# Patient Record
Sex: MALE | Race: WHITE
[De-identification: names, ages, dates, MRNs, and addresses within clinical notes are randomized per-mention and may not be internally consistent; named-entity substitution may affect disease eponyms.]

---

## 2021-09-09 ENCOUNTER — HOSPITAL ENCOUNTER (EMERGENCY)
Dept: HOSPITAL 54 - ER | Age: 41
Discharge: HOME | End: 2021-09-09
Payer: SELF-PAY

## 2021-09-09 VITALS — DIASTOLIC BLOOD PRESSURE: 76 MMHG | SYSTOLIC BLOOD PRESSURE: 129 MMHG

## 2021-09-09 VITALS — WEIGHT: 165 LBS | HEIGHT: 73 IN | BODY MASS INDEX: 21.87 KG/M2

## 2021-09-09 DIAGNOSIS — R41.0: ICD-10-CM

## 2021-09-09 DIAGNOSIS — Z59.0: ICD-10-CM

## 2021-09-09 DIAGNOSIS — F29: Primary | ICD-10-CM

## 2021-09-09 LAB
ALBUMIN SERPL BCP-MCNC: 3.9 G/DL (ref 3.4–5)
ALP SERPL-CCNC: 103 U/L (ref 46–116)
ALT SERPL W P-5'-P-CCNC: 21 U/L (ref 12–78)
APAP SERPL-MCNC: 0 UG/ML (ref 10–30)
AST SERPL W P-5'-P-CCNC: 24 U/L (ref 15–37)
BASOPHILS # BLD AUTO: 0.1 K/UL (ref 0–0.2)
BASOPHILS NFR BLD AUTO: 0.7 % (ref 0–2)
BILIRUB DIRECT SERPL-MCNC: 0 MG/DL (ref 0–0.2)
BILIRUB SERPL-MCNC: 0.2 MG/DL (ref 0.2–1)
BUN SERPL-MCNC: 18 MG/DL (ref 7–18)
CALCIUM SERPL-MCNC: 9.2 MG/DL (ref 8.5–10.1)
CHLORIDE SERPL-SCNC: 104 MMOL/L (ref 98–107)
CO2 SERPL-SCNC: 29 MMOL/L (ref 21–32)
COLOR UR: YELLOW
CREAT SERPL-MCNC: 1.1 MG/DL (ref 0.6–1.3)
EOSINOPHIL NFR BLD AUTO: 2.6 % (ref 0–6)
ETHANOL SERPL-MCNC: < 3 MG/DL (ref 0–0)
GLUCOSE SERPL-MCNC: 97 MG/DL (ref 74–106)
HCT VFR BLD AUTO: 41 % (ref 39–51)
HGB BLD-MCNC: 13.9 G/DL (ref 13.5–17.5)
LYMPHOCYTES NFR BLD AUTO: 1.4 K/UL (ref 0.8–4.8)
LYMPHOCYTES NFR BLD AUTO: 20.6 % (ref 20–44)
MCHC RBC AUTO-ENTMCNC: 34 G/DL (ref 31–36)
MCV RBC AUTO: 92 FL (ref 80–96)
MONOCYTES NFR BLD AUTO: 0.5 K/UL (ref 0.1–1.3)
MONOCYTES NFR BLD AUTO: 6.5 % (ref 2–12)
NEUTROPHILS # BLD AUTO: 4.8 K/UL (ref 1.8–8.9)
NEUTROPHILS NFR BLD AUTO: 69.6 % (ref 43–81)
PH UR STRIP: 7.5 [PH] (ref 5–8)
PLATELET # BLD AUTO: 231 K/UL (ref 150–450)
POTASSIUM SERPL-SCNC: 4.6 MMOL/L (ref 3.5–5.1)
PROT SERPL-MCNC: 7.2 G/DL (ref 6.4–8.2)
RBC # BLD AUTO: 4.41 MIL/UL (ref 4.5–6)
SODIUM SERPL-SCNC: 140 MMOL/L (ref 136–145)
UROBILINOGEN UR STRIP-MCNC: 0.2 EU/DL
WBC NRBC COR # BLD AUTO: 7 K/UL (ref 4.3–11)

## 2021-09-09 PROCEDURE — G0480 DRUG TEST DEF 1-7 CLASSES: HCPCS

## 2021-09-09 SDOH — ECONOMIC STABILITY - HOUSING INSECURITY: HOMELESSNESS: Z59.0

## 2021-09-09 NOTE — NUR
THE PATIENT RESTING COMFORTABLY IN ER BED 13. DENIES HAVING ANY DISTRESS. 
BREATHING EVEN AND UNLABORED. WILL CONTINUE TO MONITOR THE PATIENT.

## 2021-09-09 NOTE — NUR
SS Note: 



SW attempted to meet with pt. and SW was notified by nursing that pt. refused to wait for SS 
consult for homeless D/C and left stating he will look for shelter placement himself.

## 2021-09-09 NOTE — NUR
THE PATIENT [id number], FROM [place] C/O ANXIETY. THE PATIENT IS ALERT AND 
ORIENTED X4. DENIES SI/HI. IN ROOM AIR AND DENIES SOB. BREATHING EVEN AND 
UNLABORED. ATTACHED TO THE MONITOR.

WILL CONTINUE TO MONITOR THE PATIENT.

## 2021-09-19 ENCOUNTER — HOSPITAL ENCOUNTER (EMERGENCY)
Dept: HOSPITAL 54 - ER | Age: 41
Discharge: PSYCHIATRIC OR SUBSTANCE USE FACILITY | End: 2021-09-19
Payer: MEDICAID

## 2021-09-19 VITALS — WEIGHT: 162 LBS | HEIGHT: 67 IN | BODY MASS INDEX: 25.43 KG/M2

## 2021-09-19 VITALS — DIASTOLIC BLOOD PRESSURE: 60 MMHG | SYSTOLIC BLOOD PRESSURE: 115 MMHG

## 2021-09-19 DIAGNOSIS — Z20.822: ICD-10-CM

## 2021-09-19 DIAGNOSIS — Z59.0: ICD-10-CM

## 2021-09-19 DIAGNOSIS — R44.1: Primary | ICD-10-CM

## 2021-09-19 DIAGNOSIS — R44.0: ICD-10-CM

## 2021-09-19 DIAGNOSIS — Z82.49: ICD-10-CM

## 2021-09-19 DIAGNOSIS — F41.9: ICD-10-CM

## 2021-09-19 LAB
ALBUMIN SERPL BCP-MCNC: 3.7 G/DL (ref 3.4–5)
ALP SERPL-CCNC: 78 U/L (ref 46–116)
ALT SERPL W P-5'-P-CCNC: 22 U/L (ref 12–78)
APAP SERPL-MCNC: < 10 UG/ML (ref 10–30)
AST SERPL W P-5'-P-CCNC: 19 U/L (ref 15–37)
BASOPHILS # BLD AUTO: 0.1 K/UL (ref 0–0.2)
BASOPHILS NFR BLD AUTO: 0.6 % (ref 0–2)
BILIRUB DIRECT SERPL-MCNC: 0.1 MG/DL (ref 0–0.2)
BILIRUB SERPL-MCNC: 0.3 MG/DL (ref 0.2–1)
BUN SERPL-MCNC: 16 MG/DL (ref 7–18)
CALCIUM SERPL-MCNC: 9 MG/DL (ref 8.5–10.1)
CHLORIDE SERPL-SCNC: 107 MMOL/L (ref 98–107)
CO2 SERPL-SCNC: 26 MMOL/L (ref 21–32)
COLOR UR: YELLOW
CREAT SERPL-MCNC: 1.1 MG/DL (ref 0.6–1.3)
EOSINOPHIL NFR BLD AUTO: 2.2 % (ref 0–6)
ETHANOL SERPL-MCNC: < 3 MG/DL (ref 0–0)
GLUCOSE SERPL-MCNC: 101 MG/DL (ref 74–106)
HCT VFR BLD AUTO: 38 % (ref 39–51)
HGB BLD-MCNC: 12.9 G/DL (ref 13.5–17.5)
LYMPHOCYTES NFR BLD AUTO: 2 K/UL (ref 0.8–4.8)
LYMPHOCYTES NFR BLD AUTO: 23.9 % (ref 20–44)
MCHC RBC AUTO-ENTMCNC: 34 G/DL (ref 31–36)
MCV RBC AUTO: 92 FL (ref 80–96)
MONOCYTES NFR BLD AUTO: 0.6 K/UL (ref 0.1–1.3)
MONOCYTES NFR BLD AUTO: 6.8 % (ref 2–12)
NEUTROPHILS # BLD AUTO: 5.5 K/UL (ref 1.8–8.9)
NEUTROPHILS NFR BLD AUTO: 66.5 % (ref 43–81)
PH UR STRIP: 7.5 [PH] (ref 5–8)
PLATELET # BLD AUTO: 242 K/UL (ref 150–450)
POTASSIUM SERPL-SCNC: 3.8 MMOL/L (ref 3.5–5.1)
PROT SERPL-MCNC: 6.8 G/DL (ref 6.4–8.2)
RBC # BLD AUTO: 4.16 MIL/UL (ref 4.5–6)
SODIUM SERPL-SCNC: 144 MMOL/L (ref 136–145)
UROBILINOGEN UR STRIP-MCNC: 0.2 EU/DL
WBC NRBC COR # BLD AUTO: 8.2 K/UL (ref 4.3–11)

## 2021-09-19 PROCEDURE — 80143 DRUG ASSAY ACETAMINOPHEN: CPT

## 2021-09-19 PROCEDURE — 36415 COLL VENOUS BLD VENIPUNCTURE: CPT

## 2021-09-19 PROCEDURE — 85025 COMPLETE CBC W/AUTO DIFF WBC: CPT

## 2021-09-19 PROCEDURE — 80320 DRUG SCREEN QUANTALCOHOLS: CPT

## 2021-09-19 PROCEDURE — 87426 SARSCOV CORONAVIRUS AG IA: CPT

## 2021-09-19 PROCEDURE — G0480 DRUG TEST DEF 1-7 CLASSES: HCPCS

## 2021-09-19 PROCEDURE — 80076 HEPATIC FUNCTION PANEL: CPT

## 2021-09-19 PROCEDURE — 99285 EMERGENCY DEPT VISIT HI MDM: CPT

## 2021-09-19 PROCEDURE — C9803 HOPD COVID-19 SPEC COLLECT: HCPCS

## 2021-09-19 PROCEDURE — 81003 URINALYSIS AUTO W/O SCOPE: CPT

## 2021-09-19 PROCEDURE — 80048 BASIC METABOLIC PNL TOTAL CA: CPT

## 2021-09-19 PROCEDURE — 80307 DRUG TEST PRSMV CHEM ANLYZR: CPT

## 2021-09-19 SDOH — ECONOMIC STABILITY - HOUSING INSECURITY: HOMELESSNESS: Z59.0

## 2021-09-19 NOTE — NUR
MYNOR CALLED PT ACCEPTED TO [facility] UNIT [address] MCDANIEL PLEASE CALL 
[phone] FOR REPORT.

## 2021-09-19 NOTE — NUR
PT [address] C/O HEARING VOICES/HALLUCINATION. REQUESTING 
VOLUNTARY PSYCH ADMISSION TO [facility]. PT IS AAOX4, NOT IN RESPIRATORY 
DISTRESS, V/S STABLE, KEPT RESTED AND COMFORTABLE.

## 2021-09-25 ENCOUNTER — HOSPITAL ENCOUNTER (EMERGENCY)
Dept: HOSPITAL 54 - ER | Age: 41
LOS: 1 days | Discharge: PSYCHIATRIC OR SUBSTANCE USE FACILITY | End: 2021-09-26
Payer: MEDICAID

## 2021-09-25 VITALS — WEIGHT: 170 LBS | BODY MASS INDEX: 22.53 KG/M2 | HEIGHT: 73 IN

## 2021-09-25 DIAGNOSIS — Z20.822: ICD-10-CM

## 2021-09-25 DIAGNOSIS — R45.851: Primary | ICD-10-CM

## 2021-09-25 DIAGNOSIS — F29: ICD-10-CM

## 2021-09-25 LAB
ALBUMIN SERPL BCP-MCNC: 4.1 G/DL (ref 3.4–5)
ALP SERPL-CCNC: 104 U/L (ref 46–116)
ALT SERPL W P-5'-P-CCNC: 27 U/L (ref 12–78)
APAP SERPL-MCNC: < 10 UG/ML (ref 10–30)
AST SERPL W P-5'-P-CCNC: 21 U/L (ref 15–37)
BASOPHILS # BLD AUTO: 0 K/UL (ref 0–0.2)
BASOPHILS NFR BLD AUTO: 0.4 % (ref 0–2)
BILIRUB DIRECT SERPL-MCNC: 0.1 MG/DL (ref 0–0.2)
BILIRUB SERPL-MCNC: 0.3 MG/DL (ref 0.2–1)
BILIRUB UR QL STRIP: NEGATIVE
BUN SERPL-MCNC: 21 MG/DL (ref 7–18)
CALCIUM SERPL-MCNC: 8.9 MG/DL (ref 8.5–10.1)
CHLORIDE SERPL-SCNC: 103 MMOL/L (ref 98–107)
CO2 SERPL-SCNC: 29 MMOL/L (ref 21–32)
COLOR UR: YELLOW
CREAT SERPL-MCNC: 1.1 MG/DL (ref 0.6–1.3)
EOSINOPHIL NFR BLD AUTO: 3.3 % (ref 0–6)
ETHANOL SERPL-MCNC: < 3 MG/DL (ref 0–0)
GLUCOSE SERPL-MCNC: 119 MG/DL (ref 74–106)
GLUCOSE UR STRIP-MCNC: NEGATIVE MG/DL
HCT VFR BLD AUTO: 42 % (ref 39–51)
HGB BLD-MCNC: 14.5 G/DL (ref 13.5–17.5)
LEUKOCYTE ESTERASE UR QL STRIP: NEGATIVE
LYMPHOCYTES NFR BLD AUTO: 1.4 K/UL (ref 0.8–4.8)
LYMPHOCYTES NFR BLD AUTO: 25.7 % (ref 20–44)
MCHC RBC AUTO-ENTMCNC: 34 G/DL (ref 31–36)
MCV RBC AUTO: 92 FL (ref 80–96)
MONOCYTES NFR BLD AUTO: 0.6 K/UL (ref 0.1–1.3)
MONOCYTES NFR BLD AUTO: 10.6 % (ref 2–12)
NEUTROPHILS # BLD AUTO: 3.2 K/UL (ref 1.8–8.9)
NEUTROPHILS NFR BLD AUTO: 60 % (ref 43–81)
NITRITE UR QL STRIP: NEGATIVE
PH UR STRIP: 7 [PH] (ref 5–8)
PLATELET # BLD AUTO: 227 K/UL (ref 150–450)
POTASSIUM SERPL-SCNC: 4.1 MMOL/L (ref 3.5–5.1)
PROT SERPL-MCNC: 7.8 G/DL (ref 6.4–8.2)
PROT UR QL STRIP: NEGATIVE MG/DL
RBC # BLD AUTO: 4.61 MIL/UL (ref 4.5–6)
SODIUM SERPL-SCNC: 141 MMOL/L (ref 136–145)
UROBILINOGEN UR STRIP-MCNC: 0.2 EU/DL
WBC NRBC COR # BLD AUTO: 5.3 K/UL (ref 4.3–11)

## 2021-09-25 PROCEDURE — 80320 DRUG SCREEN QUANTALCOHOLS: CPT

## 2021-09-25 PROCEDURE — 81003 URINALYSIS AUTO W/O SCOPE: CPT

## 2021-09-25 PROCEDURE — 87426 SARSCOV CORONAVIRUS AG IA: CPT

## 2021-09-25 PROCEDURE — 80307 DRUG TEST PRSMV CHEM ANLYZR: CPT

## 2021-09-25 PROCEDURE — 80048 BASIC METABOLIC PNL TOTAL CA: CPT

## 2021-09-25 PROCEDURE — 80143 DRUG ASSAY ACETAMINOPHEN: CPT

## 2021-09-25 PROCEDURE — C9803 HOPD COVID-19 SPEC COLLECT: HCPCS

## 2021-09-25 PROCEDURE — 80076 HEPATIC FUNCTION PANEL: CPT

## 2021-09-25 PROCEDURE — G0480 DRUG TEST DEF 1-7 CLASSES: HCPCS

## 2021-09-25 PROCEDURE — 99285 EMERGENCY DEPT VISIT HI MDM: CPT

## 2021-09-25 PROCEDURE — 85025 COMPLETE CBC W/AUTO DIFF WBC: CPT

## 2021-09-25 PROCEDURE — 36415 COLL VENOUS BLD VENIPUNCTURE: CPT

## 2021-09-26 VITALS — SYSTOLIC BLOOD PRESSURE: 111 MMHG | DIASTOLIC BLOOD PRESSURE: 64 MMHG

## 2021-10-06 ENCOUNTER — HOSPITAL ENCOUNTER (EMERGENCY)
Dept: HOSPITAL 54 - ER | Age: 41
Discharge: PSYCHIATRIC OR SUBSTANCE USE FACILITY | End: 2021-10-06
Payer: MEDICAID

## 2021-10-06 ENCOUNTER — HOSPITAL ENCOUNTER (EMERGENCY)
Dept: HOSPITAL 54 - ER | Age: 41
Discharge: HOME | End: 2021-10-06
Payer: MEDICAID

## 2021-10-06 VITALS — BODY MASS INDEX: 41.75 KG/M2 | HEIGHT: 73 IN | WEIGHT: 315 LBS

## 2021-10-06 VITALS — BODY MASS INDEX: 24.39 KG/M2 | WEIGHT: 184 LBS | HEIGHT: 73 IN

## 2021-10-06 VITALS — DIASTOLIC BLOOD PRESSURE: 72 MMHG | SYSTOLIC BLOOD PRESSURE: 127 MMHG

## 2021-10-06 VITALS — DIASTOLIC BLOOD PRESSURE: 70 MMHG | SYSTOLIC BLOOD PRESSURE: 108 MMHG

## 2021-10-06 DIAGNOSIS — F32.A: ICD-10-CM

## 2021-10-06 DIAGNOSIS — Z20.822: ICD-10-CM

## 2021-10-06 DIAGNOSIS — G47.00: Primary | ICD-10-CM

## 2021-10-06 DIAGNOSIS — Z79.899: ICD-10-CM

## 2021-10-06 DIAGNOSIS — F41.9: ICD-10-CM

## 2021-10-06 DIAGNOSIS — F17.200: ICD-10-CM

## 2021-10-06 DIAGNOSIS — F25.9: ICD-10-CM

## 2021-10-06 DIAGNOSIS — R45.851: Primary | ICD-10-CM

## 2021-10-06 DIAGNOSIS — Z59.00: ICD-10-CM

## 2021-10-06 LAB
ALBUMIN SERPL BCP-MCNC: 3.9 G/DL (ref 3.4–5)
ALP SERPL-CCNC: 96 U/L (ref 46–116)
ALT SERPL W P-5'-P-CCNC: 30 U/L (ref 12–78)
APAP SERPL-MCNC: < 0 UG/ML (ref 10–30)
AST SERPL W P-5'-P-CCNC: 20 U/L (ref 15–37)
BASOPHILS # BLD AUTO: 0 K/UL (ref 0–0.2)
BASOPHILS NFR BLD AUTO: 0.5 % (ref 0–2)
BILIRUB DIRECT SERPL-MCNC: 0.1 MG/DL (ref 0–0.2)
BILIRUB SERPL-MCNC: 0.3 MG/DL (ref 0.2–1)
BUN SERPL-MCNC: 17 MG/DL (ref 7–18)
CALCIUM SERPL-MCNC: 8.9 MG/DL (ref 8.5–10.1)
CHLORIDE SERPL-SCNC: 106 MMOL/L (ref 98–107)
CO2 SERPL-SCNC: 27 MMOL/L (ref 21–32)
COLOR UR: YELLOW
CREAT SERPL-MCNC: 1.2 MG/DL (ref 0.6–1.3)
EOSINOPHIL NFR BLD AUTO: 4.1 % (ref 0–6)
ETHANOL SERPL-MCNC: < 3 MG/DL (ref 0–0)
GLUCOSE SERPL-MCNC: 93 MG/DL (ref 74–106)
HCT VFR BLD AUTO: 43 % (ref 39–51)
HGB BLD-MCNC: 14.6 G/DL (ref 13.5–17.5)
LYMPHOCYTES NFR BLD AUTO: 2 K/UL (ref 0.8–4.8)
LYMPHOCYTES NFR BLD AUTO: 27.3 % (ref 20–44)
MCHC RBC AUTO-ENTMCNC: 34 G/DL (ref 31–36)
MCV RBC AUTO: 92 FL (ref 80–96)
MONOCYTES NFR BLD AUTO: 0.8 K/UL (ref 0.1–1.3)
MONOCYTES NFR BLD AUTO: 11.7 % (ref 2–12)
NEUTROPHILS # BLD AUTO: 4.1 K/UL (ref 1.8–8.9)
NEUTROPHILS NFR BLD AUTO: 56.4 % (ref 43–81)
PH UR STRIP: 7 [PH] (ref 5–8)
PLATELET # BLD AUTO: 227 K/UL (ref 150–450)
POTASSIUM SERPL-SCNC: 4 MMOL/L (ref 3.5–5.1)
PROT SERPL-MCNC: 7.3 G/DL (ref 6.4–8.2)
RBC # BLD AUTO: 4.7 MIL/UL (ref 4.5–6)
RBC #/AREA URNS HPF: (no result) /HPF (ref 0–2)
SODIUM SERPL-SCNC: 141 MMOL/L (ref 136–145)
UROBILINOGEN UR STRIP-MCNC: 0.2 EU/DL
WBC #/AREA URNS HPF: (no result) /HPF (ref 0–3)
WBC NRBC COR # BLD AUTO: 7.3 K/UL (ref 4.3–11)

## 2021-10-06 PROCEDURE — 80307 DRUG TEST PRSMV CHEM ANLYZR: CPT

## 2021-10-06 PROCEDURE — G0480 DRUG TEST DEF 1-7 CLASSES: HCPCS

## 2021-10-06 PROCEDURE — 36415 COLL VENOUS BLD VENIPUNCTURE: CPT

## 2021-10-06 PROCEDURE — 80320 DRUG SCREEN QUANTALCOHOLS: CPT

## 2021-10-06 PROCEDURE — 80143 DRUG ASSAY ACETAMINOPHEN: CPT

## 2021-10-06 PROCEDURE — C9803 HOPD COVID-19 SPEC COLLECT: HCPCS

## 2021-10-06 PROCEDURE — 80048 BASIC METABOLIC PNL TOTAL CA: CPT

## 2021-10-06 PROCEDURE — 85025 COMPLETE CBC W/AUTO DIFF WBC: CPT

## 2021-10-06 PROCEDURE — 99285 EMERGENCY DEPT VISIT HI MDM: CPT

## 2021-10-06 PROCEDURE — 80076 HEPATIC FUNCTION PANEL: CPT

## 2021-10-06 PROCEDURE — 87426 SARSCOV CORONAVIRUS AG IA: CPT

## 2021-10-06 PROCEDURE — 81001 URINALYSIS AUTO W/SCOPE: CPT

## 2021-10-06 SDOH — ECONOMIC STABILITY - HOUSING INSECURITY: HOMELESSNESS UNSPECIFIED: Z59.00

## 2021-10-06 NOTE — NUR
PER ART AT SOCAL INTAKE; PT GOT ACCEPTED AT [facility] BY DR GARCIA, UNIT 
2, # FOR REPORT: [phone]

## 2021-10-06 NOTE — NUR
Patient came in to the er c/o suicidal thoughts " i want to OD on OTC pills". 
On room air, breathing evenly and unlabored. Sitter at bedside for constant 
monitoring. Will continue to monitor accordingly.

## 2021-10-15 ENCOUNTER — HOSPITAL ENCOUNTER (EMERGENCY)
Dept: HOSPITAL 54 - ER | Age: 41
Discharge: HOME | End: 2021-10-15
Payer: MEDICAID

## 2021-10-15 VITALS — SYSTOLIC BLOOD PRESSURE: 132 MMHG | DIASTOLIC BLOOD PRESSURE: 62 MMHG

## 2021-10-15 VITALS — BODY MASS INDEX: 23.86 KG/M2 | HEIGHT: 73 IN | WEIGHT: 180 LBS

## 2021-10-15 DIAGNOSIS — G47.00: ICD-10-CM

## 2021-10-15 DIAGNOSIS — Z60.2: ICD-10-CM

## 2021-10-15 DIAGNOSIS — Z79.899: ICD-10-CM

## 2021-10-15 DIAGNOSIS — F41.9: Primary | ICD-10-CM

## 2021-10-15 DIAGNOSIS — F17.200: ICD-10-CM

## 2021-10-15 DIAGNOSIS — Z76.0: ICD-10-CM

## 2021-10-15 SDOH — SOCIAL STABILITY - SOCIAL INSECURITY: PROBLEMS RELATED TO LIVING ALONE: Z60.2

## 2021-10-27 ENCOUNTER — HOSPITAL ENCOUNTER (EMERGENCY)
Dept: HOSPITAL 54 - ER | Age: 41
Discharge: HOME | End: 2021-10-27
Payer: SELF-PAY

## 2021-10-27 VITALS — SYSTOLIC BLOOD PRESSURE: 117 MMHG | DIASTOLIC BLOOD PRESSURE: 65 MMHG

## 2021-10-27 VITALS — BODY MASS INDEX: 23.86 KG/M2 | WEIGHT: 180 LBS | HEIGHT: 73 IN

## 2021-10-27 DIAGNOSIS — F32.A: ICD-10-CM

## 2021-10-27 DIAGNOSIS — Z59.01: ICD-10-CM

## 2021-10-27 DIAGNOSIS — G47.00: Primary | ICD-10-CM

## 2021-10-27 DIAGNOSIS — Z20.822: ICD-10-CM

## 2021-10-27 DIAGNOSIS — F41.9: ICD-10-CM

## 2021-10-27 DIAGNOSIS — F17.200: ICD-10-CM

## 2021-10-27 DIAGNOSIS — Z53.8: ICD-10-CM

## 2021-10-27 LAB
ALBUMIN SERPL BCP-MCNC: 3.9 G/DL (ref 3.4–5)
ALP SERPL-CCNC: 107 U/L (ref 46–116)
ALT SERPL W P-5'-P-CCNC: 53 U/L (ref 12–78)
APAP SERPL-MCNC: < 10 UG/ML (ref 10–30)
AST SERPL W P-5'-P-CCNC: 35 U/L (ref 15–37)
BASOPHILS # BLD AUTO: 0 K/UL (ref 0–0.2)
BASOPHILS NFR BLD AUTO: 0.3 % (ref 0–2)
BILIRUB DIRECT SERPL-MCNC: 0.1 MG/DL (ref 0–0.2)
BILIRUB SERPL-MCNC: 0.3 MG/DL (ref 0.2–1)
BILIRUB UR QL STRIP: NEGATIVE
BUN SERPL-MCNC: 11 MG/DL (ref 7–18)
CALCIUM SERPL-MCNC: 8.7 MG/DL (ref 8.5–10.1)
CHLORIDE SERPL-SCNC: 106 MMOL/L (ref 98–107)
CO2 SERPL-SCNC: 30 MMOL/L (ref 21–32)
COLOR UR: YELLOW
CREAT SERPL-MCNC: 1.2 MG/DL (ref 0.6–1.3)
EOSINOPHIL NFR BLD AUTO: 3.8 % (ref 0–6)
ETHANOL SERPL-MCNC: < 3 MG/DL (ref 0–0)
GLUCOSE SERPL-MCNC: 83 MG/DL (ref 74–106)
GLUCOSE UR STRIP-MCNC: NEGATIVE MG/DL
HCT VFR BLD AUTO: 43 % (ref 39–51)
HGB BLD-MCNC: 14.5 G/DL (ref 13.5–17.5)
LEUKOCYTE ESTERASE UR QL STRIP: NEGATIVE
LYMPHOCYTES NFR BLD AUTO: 1.8 K/UL (ref 0.8–4.8)
LYMPHOCYTES NFR BLD AUTO: 26 % (ref 20–44)
MCHC RBC AUTO-ENTMCNC: 34 G/DL (ref 31–36)
MCV RBC AUTO: 92 FL (ref 80–96)
MONOCYTES NFR BLD AUTO: 0.6 K/UL (ref 0.1–1.3)
MONOCYTES NFR BLD AUTO: 8.4 % (ref 2–12)
NEUTROPHILS # BLD AUTO: 4.2 K/UL (ref 1.8–8.9)
NEUTROPHILS NFR BLD AUTO: 61.5 % (ref 43–81)
NITRITE UR QL STRIP: NEGATIVE
PH UR STRIP: 7 [PH] (ref 5–8)
PLATELET # BLD AUTO: 215 K/UL (ref 150–450)
POTASSIUM SERPL-SCNC: 3.6 MMOL/L (ref 3.5–5.1)
PROT SERPL-MCNC: 7.4 G/DL (ref 6.4–8.2)
PROT UR QL STRIP: NEGATIVE MG/DL
RBC # BLD AUTO: 4.63 MIL/UL (ref 4.5–6)
SODIUM SERPL-SCNC: 142 MMOL/L (ref 136–145)
UROBILINOGEN UR STRIP-MCNC: 0.2 EU/DL
WBC NRBC COR # BLD AUTO: 6.8 K/UL (ref 4.3–11)

## 2021-10-27 PROCEDURE — C9803 HOPD COVID-19 SPEC COLLECT: HCPCS

## 2021-10-27 PROCEDURE — 99283 EMERGENCY DEPT VISIT LOW MDM: CPT

## 2021-10-27 PROCEDURE — 80143 DRUG ASSAY ACETAMINOPHEN: CPT

## 2021-10-27 PROCEDURE — 80076 HEPATIC FUNCTION PANEL: CPT

## 2021-10-27 PROCEDURE — 81003 URINALYSIS AUTO W/O SCOPE: CPT

## 2021-10-27 PROCEDURE — 85025 COMPLETE CBC W/AUTO DIFF WBC: CPT

## 2021-10-27 PROCEDURE — 80307 DRUG TEST PRSMV CHEM ANLYZR: CPT

## 2021-10-27 PROCEDURE — 36415 COLL VENOUS BLD VENIPUNCTURE: CPT

## 2021-10-27 PROCEDURE — 80048 BASIC METABOLIC PNL TOTAL CA: CPT

## 2021-10-27 PROCEDURE — 80320 DRUG SCREEN QUANTALCOHOLS: CPT

## 2021-10-27 PROCEDURE — 87426 SARSCOV CORONAVIRUS AG IA: CPT

## 2021-10-27 PROCEDURE — G0480 DRUG TEST DEF 1-7 CLASSES: HCPCS

## 2021-10-27 SDOH — ECONOMIC STABILITY - HOUSING INSECURITY: SHELTERED HOMELESSNESS: Z59.01

## 2021-10-27 NOTE — NUR
THE PATIENT BIBS FOR FEELING ANXIOUS, HAVING AUDITORY HALLUCINATIONS BUT CAN`T 
RECALL WHAT ARE THE VOICES SAYING. DENIES SI/HI. THE PATIENT WANTS TO GO TO [facility]. ALERT AND ORIENTED X4. DENIES PAIN. IN ROOM AIR AND DENIES SOB. 
RESPIRATION REGULAR AND UNLABORED. WILL CONTINUE TO MONITOR THE PATIENT.

## 2021-10-27 NOTE — NUR
SS consult: 

SS consult requested for homelessness & mental health concerns. Pt. is a 
41-year-old  male who presented to the ER wanting medical clearance to 
go to [facility][[address] [place] [address] [phone] FAX:[phone]]  for medication 
adjustement per pt. Per the EMR, pt. is homeless and is feeling anxious. Upon 
SS consult, pt. is A&O x4 and appears unkempt. Pt. presents with an anxious 
mood and speech is WNL. Pt. was pleasant & cooperative throughout SS 
assessment. SW explored pt. 's living situation and pt. he has been homeless 
for three months. SW offered homeless resources and pt. accepted. Pt. stated he 
will be moving into transitional housing on November 1st. Pt. stated he 
receives SSDI & EBT. SW explored pt.'s psychiatric history. Pt. stated he has 
been diagnosed with Generalized Anxiety Disorder and Insomnia. However, pt. 
also stated he is currently not on medicationa dn that is why he is seeking 
medical attention. Pt. stated he is having auditory hallucinations due to his 
chronic insomnia. Pt. stated he wants medication so he can sleep at night. Pt. 
denies current SI and stated he had SI a month ago and denies current SI. Pt. 
denies HI and denies visual hallucinations. SW explored pt.'s support system 
and pt. states he has some family who he keeps in contact with.



Pt. signed homeless waive and it was placed in the pt.'s chart. [facility] provided pt. 
with homeless resources and pt. accepted them.



Plan: Pt. was referred to [facility] [[address] 
[place] [address] [phone] FAX:[phone]] 
for inpatient psychiatric treatment. [facility]LEMA was notified that pt. was not accepted. 
[facility]ULEMA provided pt. with outpatient mental health resources and pt. accepted them. 



Mental Health resources provided: [facility] [address] [place] [address] [phone]; [facility]. [address] 
[facility] [address], [place] [address] [phone]; [facility] [address] [place] [address] [phone]; [facility] [address] [place], [place] 
[address] [phone]

Healthcare Clinics: [facility] [address], [address] [place]. [place] [phone]; [facility] [address] [place] [address]; [facility] [address] [facility] [place] [address] [phone]



Counseling--Outpatient

[facility]

[address], [address] [place]

[place] [address] [phone]

(Specializes in in-depth psychotherapy for emotional distress: anxiety, 
depression, interpersonal conflicts, life transitions, childhood abuse)



[facility]

[address] [place] [address] [phone]

(Assist with solving problem marital difficulties, separation & divorce, aging 
parents, death & grief, chronic & terminal illness)



[facility]

[address] [place] [address] [phone]

(Deal with loss & grief, anxiety, marital difficulties) 



Homebound/Mental Health Services

[address] [facility], [address]

[place] [address] [phone]

(Provide in-home mental services to people who are incapable of leaving their 
homes)



[facility]eds of the Elderly

Senior Service/Resource Center

[address] [place]

[place] [address] [phone]



[facility] 

[address]

[place] [address] [phone]

PSYCHIATRIC OUTPATIENT SERVICES



[facility] Partial Hospitalization and Intensive Outpatient Program 
(Managed Care and [place] Only)[address].

[facility] [phone]

[facility]

Partial Hospitalization and Outpatient [address]  [address]

[place], [place] [phone]

KANE MORROW

[facility] [address] [facility]. 
[address]

[place], [place] [phone]

[facility] Partial Hospitalization and Outpatient 
[address], [phone]



Substance Abuse resources provided included: [facility] Substance Abuse 
Self-Helpline ([facility]) [phone]; [facility] [address] [facility]. [place] [address] [phone]; [facility] [address] [facility] [address] [phone]; [facility] [address] 
[facility] [place] [address] [phone]; [facility] 
[id number] [phone];  [facility] [address] [facility] [address] [phone]; Lynne 
[facility] [address] [facility] [id number] [phone]; [facility] 
Substance Abuse Helpline([facility] [phone]; [facility]  [phone]; [facility] [phone] [place]; [facility]  [phone] [place]; [facility]  [phone] [place]; [facility] [phone] [place]; 
[facility]  [phone] [place]; [facility] [place] [phone] 
[place]; [facility] [phone] [facility]; [facility] [place] [phone] [place]; Alcoholics Anonymous (909) 981-6751-SFV; 
[facility] [phone] [phone]; Marijuana Anonymous (445) 881-2123-SFV; 
Narcotics Anonymous www.na.org;

Year-round shelters: [facility] [address] [place] [address] 
[phone]; [facility] [address] [place] [address]; 
[facility] [address] [facility] [address] [phone]

Winter Shelters: 

Katrina Moser Goodwin

Provider: [facility] [place]

Address: [address] [place], [address]

# of Beds: 47

Phone Number: [phone]

Population Served: [facility]

 

\[facility]\"" 6 | [facility]

 

Lyssa Huerta Goodwin

Provider: [facility]

Address: [address] [facility], [address] 

# of Beds: 66

Phone Number: [phone]

Population Served: [facility]

 

[place]

Provider: [facility]

Address: [address] [facility], [address]

# of Beds: 56

Phone Number: [phone]

Population Served: [facility]

 

[place] 

Provider: [facility]

Address: [address], [address]

# of Beds: 49

Phone Number: [phone]

Population Served: [facility]

 

SPA 8 | [facility] [place])

Provider: [facility]

Address: [address] [facility], [address]

# of Beds: 37

Phone Number: [phone]

Population Served: [facility]



Hygiene: [facility] [address] [phone]; [facility] [address] [facility] [phone]; [facility] [address], [place] [phone].

Food Resources: [place] [facility] at [facility] [address] [place]; [facility] ([facility]) [address]; [facility] [address] [place]; 
[facility] [address] [place].

## 2021-11-05 NOTE — NUR
PT IS NOT IN ROOM. CHECKED IN THE RESTROOM AND OUTSIDE OF THE R BUT NO WHERE TO 
BE FOUND. PT WAS LAST SEEN WHEN URINE WAS COLLECTED. MD MADE AWARE

## 2021-11-06 NOTE — NUR
PT BIB SELF RQUESTING MED CLEARANCE FOR VOLUNTARY PSYCH ADMISSION TO [facility]. DENIES SI/HI. PT IS AAOX4, NOT IN RESPIRATORY DISTRESS, V/S STABLE, KEPT 
RESTED AND COMFORTABLE. WILL CONTINUE TO MONITOR.

## 2021-11-06 NOTE — NUR
TRANSFER INFO: PT ACCEPTED AT [facility] BY DR GARCIA, RN FOR REPORT 
[phone], ETA 45 MIN TO 1 HOUR

## 2021-12-06 NOTE — NUR
Patient discharged to home in stable condition. Written and verbal after care 
instructions given. Patient verbalizes understanding of instruction. RX GIVEN

## 2022-02-20 NOTE — NUR
TO ER BED 14, C/O ABD PAIN WITH DIARRHEA & VOMITING SINCE THIS AM. HX OF 
ULCERATIVE COLITIS, P/S 7/10, AAOX3, BREATHING EVEN AND NON LABORED, AWAITING 
MD OROSCO

## 2022-02-20 NOTE — NUR
Patient discharged to home in stable condition. Written and verbal after care 
instructions given. Patient verbalizes understanding of instruction. PT  
ambulatory with a steady gait

## 2022-03-05 NOTE — NUR
TO ER BED 12, BIB SELF C/O HEADACHE STARTED 3 HRS AGO, AAOX3, BRATHING EVEN AND 
NON LABORED, CONNECTED TO MONITOR, AWAITING MD OROSCO

## 2022-03-19 NOTE — NUR
Patient given written and verbal discharge instructions. Patient verbalizes 
understanding of instructions. Patient is ambulatory with steady gait. Refuses 
offer of shelter placement. Patient given list of available shelters in 
surrounding area. In proper clothing. Name band removed. All belongings with 
the patient.

## 2022-03-23 NOTE — NUR
BIBS C/O HAVING S/I WITH PLAN TO OD ON PILLS. SEEKING VOLUNTARY ADMISSION TO 
[facility]. ALSO C/O MIGRAINES. PATIENT ALERT AND ORIENTED X3. AMBULATORY 
WITH NON LABORED BREATHING BELONGINGS TAKEN AND PUT IN A LOCKER.

## 2022-05-07 NOTE — NUR
TO ER BED 19. BIBS FOR S/I WITH PLAN TO OD. ALSO C/O L ANKLE PAIN. PT CHANGED 
INTO GOWN. BELONGINGS OBTAINED AND SECURED. 1:1 SITTER. V/S WITHIN LIMITS. WILL 
CONTINUE TO MONITOR.

## 2022-05-08 NOTE — NUR
Pt accepted at [facility] under the care of Dr. Elizabeth. Room will be given 
upon arrival. Report to

## 2022-05-08 NOTE — NUR
[facility] AT BEDSIDE FOR PT TRANSPORT TO [facility] IN STABLE 
CONDITION. NAD NOTED DURING TRANSPORT.

## 2022-06-02 NOTE — NUR
PT BIB SELF C/O "I WANT TO HURT SOMEONE" REQUESTING VOLUNTARY PSYCH ADMISSION. 
PT IS AAOX4, NOT IN RESPIRATORY DISTRESS, V/S STABLE, KEPT RESTED AND 
COMFORTABLE. WILL CONTINUE TO MONITOR.

## 2022-06-03 NOTE — NUR
-------------------------------------------------------------------------------

          *** Note undone in [facility] - 06/03/22 at 0615 by MARK ***          

-------------------------------------------------------------------------------

cilincals faxed to [facility] intake

## 2022-06-18 NOTE — NUR
-------------------------------------------------------------------------------

          *** Note kirstyone in EDM - 06/18/22 at 0948 by LE ***           

-------------------------------------------------------------------------------

BIBS FOR SI WITH NO PLAN. -HI, - AUDITORY HALLUCINATIONS, + VISUAL BIBS FOR SI 
WITH NO PLAN. -HI, - AUDITORY HALLUCINATIONS, + VISUAL THE PATIENT WANT VOL 
ADMISSION TO [facility]. THE PATIENT IS ALERT AND ORIENTED X4. IN ROOM AIR AND 
DENIES SOB. RESPIRATION REGULAR AND UNLABORED. WILL CONTINUE TO MONITOR THE 
PATIENT.

## 2022-06-18 NOTE — NUR
-------------------------------------------------------------------------------

          *** Note undone in ED - 06/18/22 at 1438 by LE ***           

-------------------------------------------------------------------------------

BIBS FOR C/O MOUTH PAIN 10/10. THE PATIENT HAD ROOT CANAL DONE YESTERDAY

## 2022-06-18 NOTE — NUR
BIBS FOR SI WITH NO PLAN. -HI, - AUDITORY HALLUCINATIONS, + VISUAL 
HALLUCINATIONS " I SEE BLACK AND YELLOW SPOTS WHEN HAVING INSOMNIA" THE PATIENT 
WANTS VOL ADMISSION TO [facility]

## 2022-06-26 NOTE — NUR
Patient caMe in to the er c/o denies SI/HI " i can't sleep, having 
hallucination" " i want vouluntary admission to [facility]". On room air, 
breathing evenly and unlabored. Kept comfortable, will continue to monitor 
accordingly.

## 2022-06-27 NOTE — NUR
PER MAY AT SOCAL INTAKE PATIENT GOT ACCPETED AT [facility] AT [place] 
UNDER THE CARE OF DR CEE

## 2022-07-06 ENCOUNTER — HOSPITAL ENCOUNTER (EMERGENCY)
Dept: HOSPITAL 87 - ER | Age: 42
Discharge: HOME | End: 2022-07-06
Payer: MEDICAID

## 2022-07-06 VITALS — SYSTOLIC BLOOD PRESSURE: 115 MMHG | DIASTOLIC BLOOD PRESSURE: 69 MMHG

## 2022-07-06 VITALS — WEIGHT: 196.21 LBS | BODY MASS INDEX: 26 KG/M2 | HEIGHT: 73 IN

## 2022-07-06 DIAGNOSIS — M19.90: ICD-10-CM

## 2022-07-06 DIAGNOSIS — Z76.5: ICD-10-CM

## 2022-07-06 DIAGNOSIS — G43.909: Primary | ICD-10-CM

## 2022-07-06 DIAGNOSIS — I49.8: ICD-10-CM

## 2022-07-06 LAB
BASOPHILS NFR BLD AUTO: 0.3 % (ref 0–2)
CHLORIDE SERPL-SCNC: 103 MEQ/L (ref 98–107)
EOSINOPHIL NFR BLD AUTO: 3 % (ref 0–5)
ERYTHROCYTE [DISTWIDTH] IN BLOOD BY AUTOMATED COUNT: 12.3 % (ref 11.6–14.6)
HCT VFR BLD AUTO: 43.6 % (ref 42–52)
HGB BLD-MCNC: 14.9 G/DL (ref 14–18)
LYMPHOCYTES NFR BLD AUTO: 35.9 % (ref 20–50)
MCH RBC QN AUTO: 31.7 PG (ref 28–32)
MCV RBC AUTO: 92.6 FL (ref 80–94)
MONOCYTES NFR BLD AUTO: 7.8 % (ref 2–8)
NEUTROPHILS NFR BLD AUTO: 53 % (ref 40–76)
PLATELET # BLD AUTO: 241 X1000/UL (ref 130–400)
PMV BLD AUTO: 7.6 FL (ref 7.4–10.4)
RBC # BLD AUTO: 4.71 MILL/UL (ref 4.7–6.1)

## 2022-07-06 PROCEDURE — 36415 COLL VENOUS BLD VENIPUNCTURE: CPT

## 2022-07-06 PROCEDURE — 85025 COMPLETE CBC W/AUTO DIFF WBC: CPT

## 2022-07-06 PROCEDURE — 99284 EMERGENCY DEPT VISIT MOD MDM: CPT

## 2022-07-06 PROCEDURE — 80053 COMPREHEN METABOLIC PANEL: CPT

## 2022-07-06 PROCEDURE — 93005 ELECTROCARDIOGRAM TRACING: CPT

## 2022-08-19 NOTE — NUR
TAMIA C/O SI PLAN TO "SHOOT GUN TO BRAIN" REQUESTING VOLUNTARY ADMIT TO [facility]. PT A/OX4. TOELRATING R/A WELL WITH NO SOB. AMBULATORY WITH STEADY 
GAIT. PT CHANGED IN GOWN, BELONGINGS COLLECTED, WANDED BY SECURITY. SAFETY 
MEASURES IN PLACE.

## 2022-08-20 NOTE — NUR
PT ACCEPTED TO [facility] BY DR COURTNEY. # FOR REPORT [phone].

## 2022-08-25 NOTE — NUR
Patient discharged to [facility], picked up by transportation. Written and 
verbal after care instructions given. Patient verbalizes understanding of 
instruction.

## 2022-08-25 NOTE — NUR
TO ER BED 7, ALMA [facility] WITH [facility]AN TO 
SHOOT SELF, JUST RECENTLY LEFT [facility] THOMPSON, AAOX4, COOPERATIVE, BREATHING EVEN 
AND NON LABORED, AWAITING MD ORDERS

## 2022-09-04 NOTE — NUR
pt picked up by [facility] ambulance for transfer to [facility] in stable condition. all 
belongings transported with patient

## 2022-09-21 NOTE — NUR
CAME IN FOR INCREASING ANXIETY/DEPRESSION, REQUESTING VOLUNTARY ADMISSION TO 
[facility] PAOLA MORROW,DENIES SI/HI. TO ER BED 18, HOOKED TO MONITOR, CHANGED TO HOSP 
GOWN, WANDED BY SECURITY. ALL BELONGINGS PLACED IN PATIENT LOCKER. AWAITING MD OROSCO

## 2022-09-21 NOTE — NUR
[facility] AT [phone] FOR REPORT. LEFT VOICEMAIL.

## 2022-11-21 NOTE — NUR
PRESENTED TO THE ER FOR C/O SI PLANNING TO RUN IN TO TRAFFIC. A, OX4, 
AMBULATORY TO THE BATHROOM. URINE SAMPLE AND COVID SWAB OBTAINED AND SENT TO 
LAB. BELONGINGS WERE TAKEN AND PLACED IN THE LOCKER. GOWNED UP AND PLACED ON SI 
PRECAUTION. PT WS NOTED WOTH L HAND/ WRIST CAST WHICH MD MADE AWARE OF. VSS. 
WILL CONT TO MONITOR

## 2022-11-21 NOTE — NUR
ACCEPTED AT [facility]. REPORT TO NURSING SUPERVISOR [phone]

TRANSPORT ARRIVAL ETA [phone]